# Patient Record
Sex: MALE
[De-identification: names, ages, dates, MRNs, and addresses within clinical notes are randomized per-mention and may not be internally consistent; named-entity substitution may affect disease eponyms.]

---

## 2020-04-26 ENCOUNTER — TRANSCRIPTION ENCOUNTER (OUTPATIENT)
Age: 55
End: 2020-04-26

## 2020-05-18 ENCOUNTER — FORM ENCOUNTER (OUTPATIENT)
Age: 55
End: 2020-05-18

## 2020-05-18 ENCOUNTER — NON-APPOINTMENT (OUTPATIENT)
Age: 55
End: 2020-05-18

## 2020-05-18 ENCOUNTER — APPOINTMENT (OUTPATIENT)
Dept: PULMONOLOGY | Facility: CLINIC | Age: 55
End: 2020-05-18
Payer: COMMERCIAL

## 2020-05-18 PROBLEM — Z00.00 ENCOUNTER FOR PREVENTIVE HEALTH EXAMINATION: Status: ACTIVE | Noted: 2020-05-18

## 2020-05-18 PROCEDURE — 99203 OFFICE O/P NEW LOW 30 MIN: CPT | Mod: 95

## 2020-05-18 NOTE — HISTORY OF PRESENT ILLNESS
[FreeTextEntry1] : Mr. Montes, no medical history, initially started to have COVID19 symptom in March. Primary symptoms were ageusia and anosmia. Did not have respiratory issues. Had some initial headaches. All symptoms except headaches resolved. The headaches have returned in April; they are consistent; not associated with photophobia or auras. Not localized to one part of the head. Probably worse with movement but not positional. Had MRI/MRA imaging with Dr. Wolf ([place]) and it was negative as per report. Has never been hospitalized for COVID related treatment. No bloodwork ever done. No other medical history. Family history of smoking related cancer. Never smoker. Has tried Fioricet without relief; OTC NSAIDs and APAP have not been effective.

## 2020-05-18 NOTE — PLAN
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 Enrolled in the [employer] program. There are no red flags symptoms/signs during our visit. Negative MRI/MRA imaging is reassuring; will obtain records for completeness for an objective review. Referred him for home based lab draws, including IgG antibodies against COVID19. Also referred him to home care for an objective RN assessment. Once blood work is resulted will recommend some therapies, including Mg versus Toradol versus Medrol pack (if other options fail). I will speak to Mr. Montes within 24 hours after blood work has been resulted. All questions answered.

## 2020-05-19 ENCOUNTER — NON-APPOINTMENT (OUTPATIENT)
Age: 55
End: 2020-05-19

## 2020-05-19 RX ORDER — OMEGA-3/DHA/EPA/FISH OIL 300-1000MG
400 CAPSULE ORAL AT BEDTIME
Qty: 30 | Refills: 0 | Status: ACTIVE | COMMUNITY
Start: 2020-05-19 | End: 1900-01-01

## 2020-05-20 ENCOUNTER — APPOINTMENT (OUTPATIENT)
Dept: NEUROLOGY | Facility: CLINIC | Age: 55
End: 2020-05-20
Payer: COMMERCIAL

## 2020-05-20 DIAGNOSIS — R51 HEADACHE: ICD-10-CM

## 2020-05-20 PROCEDURE — 99215 OFFICE O/P EST HI 40 MIN: CPT | Mod: 95

## 2020-05-20 RX ORDER — KETOROLAC TROMETHAMINE 10 MG/1
10 TABLET, FILM COATED ORAL 3 TIMES DAILY
Qty: 15 | Refills: 0 | Status: ACTIVE | COMMUNITY
Start: 2020-05-20 | End: 1900-01-01

## 2020-05-21 PROBLEM — R51 HEADACHE DUE TO LOW CEREBROSPINAL FLUID PRESSURE: Status: ACTIVE | Noted: 2020-05-21

## 2020-05-21 NOTE — DISCUSSION/SUMMARY
[FreeTextEntry1] : Impression:\par 1) chronic daily headache >6 weeks now, No migranous features, and with a postural component.  Never completely improved, but much better when lying.  Worse when moving around.  Sensation of bifrontal heaviness.  Butalbital used, but only at night.  Naproxen, advil, tylenol also tried, and started magnesium yesterday.  MRI brain with non-specific WM changes.  No sign of brain sag, but not specifically tested.  Postural component and character fits pattern of low pressure headache, possibly related to coughing during covid, though he reports no violent coughing.\par \par 2) covid/post-covid: pt did present with anosmia and mild headache, so some concern regarding wilfrido-covid inflammation as an CNS irritant.  Looks too well for neurotropism and MRI negative.  However, may respond to anti-inflammatories, such as toradol or medrol dose-curtis.\par \par \par Plan:\par 1) top on DDX is low pressure headaches, will need to repeat MRI brain, but with contrast, and additionally perform MRI myelography to look for nerve root sleeve leakage site.\par 2) caffeine and fluids can be helpful, so butalbital/caffeine tid may be helpful to temporize\par 3) consider medrol dose pack if ineffective.\par

## 2020-05-21 NOTE — PHYSICAL EXAM
[FreeTextEntry1] : \par No expressive or receptive errors.\par \par General:\par Constitutional:  Sitting comfortably in NAD.\par Psychiatric: well-groomed, appropriate affect, insight/judgment intact\par No nuchal rigidity or neck tightness\par \par Cognitive:\par Orientation, language, memory and knowledge screens intact.\par Cranial Nerves:\par IIII/IV/VI: PERRL EOMF No nystagmus\par VII: Face appears symmetric \par XII: Tongue midline\par \par Motor:\par Power: no pronator drift.\par \par Coordination/Gait:\par Fine motor normal with normal rapid finger taps\par

## 2020-05-21 NOTE — HISTORY OF PRESENT ILLNESS
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  \par Slight headache 5/10 at the beginning was in the forehead.   Worsened when moving around.\par Returned to work and working out.  But 2 weeks later, 8/10, still dull forehead, maybe more holocephalic.  \par No nausea. No hypersensitivity.\par Lying down not so bad, 2-3/10.  Never a 0/10.  Sleeps 5-6 hours\par Works out 2hours/day, 5d/week.  Cardio and strength. He stopped because of the pain - tried a stepper and pushups without any worsening.\par \par No neck stiffness pain.  No jaw, ear or dental pain or issues.\par \par No prior history of headache.  Never very sick.\par No family history of headaches.\par \par Work:  [employer], supervises a lot of the work.\par Never Smoker\par Alcohol - light beers on weekend, none in weeks.\par No recreational habits.\par \par Butalbital unhelpful, took 4 total, may have helped him sleep, but did not try it during the day.\par No other caffeine containing medications.\par OTC unhelpful.  Naproxen.\par Magnesium started last night.\par \par

## 2020-05-29 ENCOUNTER — APPOINTMENT (OUTPATIENT)
Dept: MRI IMAGING | Facility: CLINIC | Age: 55
End: 2020-05-29
Payer: COMMERCIAL

## 2020-05-29 ENCOUNTER — OUTPATIENT (OUTPATIENT)
Dept: OUTPATIENT SERVICES | Facility: HOSPITAL | Age: 55
LOS: 1 days | End: 2020-05-29

## 2020-05-29 PROCEDURE — 72158 MRI LUMBAR SPINE W/O & W/DYE: CPT | Mod: 26

## 2020-05-29 PROCEDURE — 70553 MRI BRAIN STEM W/O & W/DYE: CPT | Mod: 26

## 2020-05-29 PROCEDURE — 72157 MRI CHEST SPINE W/O & W/DYE: CPT | Mod: 26

## 2020-05-29 PROCEDURE — 72156 MRI NECK SPINE W/O & W/DYE: CPT | Mod: 26

## 2020-06-01 ENCOUNTER — FORM ENCOUNTER (OUTPATIENT)
Age: 55
End: 2020-06-01

## 2020-06-03 RX ORDER — METHYLPREDNISOLONE 4 MG/1
4 TABLET ORAL
Qty: 1 | Refills: 0 | Status: ACTIVE | COMMUNITY
Start: 2020-06-03 | End: 1900-01-01

## 2020-06-08 ENCOUNTER — APPOINTMENT (OUTPATIENT)
Dept: NEUROLOGY | Facility: CLINIC | Age: 55
End: 2020-06-08
Payer: COMMERCIAL

## 2020-06-08 DIAGNOSIS — R51 HEADACHE: ICD-10-CM

## 2020-06-08 DIAGNOSIS — U07.1 COVID-19: ICD-10-CM

## 2020-06-08 PROCEDURE — 99213 OFFICE O/P EST LOW 20 MIN: CPT | Mod: 95

## 2020-06-08 RX ORDER — VENLAFAXINE HYDROCHLORIDE 75 MG/1
75 CAPSULE, EXTENDED RELEASE ORAL
Qty: 90 | Refills: 0 | Status: ACTIVE | COMMUNITY
Start: 2020-06-08 | End: 1900-01-01

## 2020-06-08 NOTE — HISTORY OF PRESENT ILLNESS
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  Has trouble putting a number /10 but severe enough to alter his daily behaviors significantly.\par \par Still with a sense of pain when vertical - though no evidence of CSF leak on MRI or MRI myelography.\par When upright more than 25 minutes, he finds he needs to lie down again.  It is holocephalic.\par The medrol dose pack was ineffective, today is the final day of that dose.\par He feels that every medication has been ineffective - butalbital, naproxen, toradol, magnesium and now medrol dose-pack.\par No nausea, no autonomic symptoms. \par \par Has not worsened, no improvement.\par \par Prior:\par Late March 2020: developed Covid-19, cough, fever x 48h, no taste no smell, though this resolved in 2 weeks.  \par Slight headache 5/10 at the beginning was in the forehead.   Worsened when moving around.\par Returned to work and working out.  But 2 weeks later, 8/10, still dull forehead, maybe more holocephalic.  \par No nausea. No hypersensitivity.\par Lying down not so bad, 2-3/10.  Never a 0/10.  Sleeps 5-6 hours\par Works out 2hours/day, 5d/week.  Cardio and strength. He stopped because of the pain - tried a stepper and pushups without any worsening.\par \par No neck stiffness pain.  No jaw, ear or dental pain or issues.\par \par No prior history of headache.  Never very sick.\par No family history of headaches.\par \par Work:  [employer], supervises a lot of the work.\par Never Smoker\par Alcohol - light beers on weekend, none in weeks.\par No recreational habits.\par \par Butalbital unhelpful, took 4 total, may have helped him sleep, but did not try it during the day.\par No other caffeine containing medications.\par OTC unhelpful.  Naproxen.\par Magnesium started last night.\par

## 2020-06-08 NOTE — DISCUSSION/SUMMARY
[FreeTextEntry1] : Pt reports no significant improvements with the post-Covid new onset headache that is still exquisitely postural.  No migranous features, no autonomic symptoms.\par \par Nothing else helps (butalbital, NSAIDS, medrol, magnesium) with the headache except lying down.  Keeping well hydrated.  Has not tried high caffeine intake.  Have not attempted daily preventative or triptan as of yet.\par MRI brain+C without evidence of intracranial hypotension, and MRI spine without evidence for CSF leak.\par Still no neck pain.\par \par Plan:\par 1) will attempt effexor, SNRI which may help with pressure and pain sensation\par 2) second opinion with Dr. Logan Adam\par 3) may also consider low-pressure headache consult at [facility] at the [facility].\par

## 2020-06-08 NOTE — CONSULT LETTER
[Dear  ___] : Dear ~VINEET, [Referral Letter:] : I am referring [unfilled] to you for further evaluation.  My most recent evaluation follows. [Please see my note below.] : Please see my note below. [Referral Closing:] : Thank you very much for seeing this patient.  If you have any questions, please do not hesitate to contact me. [Sincerely,] : Sincerely, [FreeTextEntry2] : Logan Adam. MD [FreeTextEntry3] : [place]

## 2020-06-08 NOTE — PHYSICAL EXAM
[FreeTextEntry1] : \par General:\par Constitutional:  Sitting comfortably in NAD.\par Psychiatric: well-groomed, appropriate affect, insight/judgment intact\par \par Cognitive:\par Orientation, language, memory and knowledge screens intact.\par No expressive or receptive errors.\par \par Cranial Nerves:\par VII: Face appears symmetric \par XII: Tongue midline\par \par Motor:\par Power: no pronator drift.\par \par Coordination/Gait:\par Fine motor normal with normal rapid finger taps\par

## 2020-06-09 PROBLEM — U07.1 COVID-19 VIRUS INFECTION: Status: ACTIVE | Noted: 2020-05-18

## 2020-06-09 PROBLEM — R51 HEADACHE: Status: ACTIVE | Noted: 2020-05-19

## 2020-06-19 ENCOUNTER — TRANSCRIPTION ENCOUNTER (OUTPATIENT)
Age: 55
End: 2020-06-19

## 2020-06-19 ENCOUNTER — APPOINTMENT (OUTPATIENT)
Dept: PAIN MANAGEMENT | Facility: CLINIC | Age: 55
End: 2020-06-19
Payer: COMMERCIAL

## 2020-06-19 DIAGNOSIS — G44.52 NEW DAILY PERSISTENT HEADACHE (NDPH): ICD-10-CM

## 2020-06-19 DIAGNOSIS — G44.229 CHRONIC TENSION-TYPE HEADACHE, NOT INTRACTABLE: ICD-10-CM

## 2020-06-19 PROCEDURE — 99214 OFFICE O/P EST MOD 30 MIN: CPT | Mod: 95

## 2020-06-19 RX ORDER — TIZANIDINE 4 MG/1
4 TABLET ORAL
Qty: 45 | Refills: 1 | Status: ACTIVE | COMMUNITY
Start: 2020-06-19 | End: 1900-01-01

## 2020-06-19 RX ORDER — DEXAMETHASONE 4 MG/1
4 TABLET ORAL
Qty: 12 | Refills: 0 | Status: ACTIVE | COMMUNITY
Start: 2020-06-19 | End: 1900-01-01

## 2020-06-19 RX ORDER — TOPIRAMATE 25 MG/1
25 TABLET, FILM COATED ORAL
Qty: 120 | Refills: 3 | Status: ACTIVE | COMMUNITY
Start: 2020-06-19 | End: 1900-01-01

## 2020-06-19 NOTE — PHYSICAL EXAM
[General Appearance - Alert] : alert [General Appearance - In No Acute Distress] : in no acute distress [General Appearance - Well Developed] : well developed [General Appearance - Well Nourished] : well nourished [General Appearance - Well-Appearing] : healthy appearing [Oriented To Time, Place, And Person] : oriented to person, place, and time [Impaired Insight] : insight and judgment were intact [Mood] : the mood was normal [Affect] : the affect was normal [Memory Remote] : remote memory was not impaired [Person] : oriented to person [Memory Recent] : recent memory was not impaired [Short Term Intact] : short term memory intact [Time] : oriented to time [Place] : oriented to place [Remote Intact] : remote memory intact [Naming Objects] : no difficulty naming common objects [Registration Intact] : recent registration memory intact [Fluency] : fluency intact [Reading] : reading intact [Comprehension] : comprehension intact [Past History] : adequate knowledge of personal past history [Current Events] : adequate knowledge of current events [Cranial Nerves Facial (VII)] : face symmetrical [Vocabulary] : adequate range of vocabulary [Cranial Nerves Vestibulocochlear (VIII)] : hearing was intact bilaterally [Cranial Nerves Oculomotor (III)] : extraocular motion intact [Cranial Nerves Accessory (XI - Cranial And Spinal)] : head turning and shoulder shrug symmetric [Cranial Nerves Hypoglossal (XII)] : there was no tongue deviation with protrusion [No Muscle Atrophy] : normal bulk in all four extremities [Motor Handedness Right-Handed] : the patient is right hand dominant [Sclera] : the sclera and conjunctiva were normal [Strabismus] : no strabismus was seen [No DONATO] : no internuclear ophthalmoplegia [Neck Cervical Mass (___cm)] : no neck mass was observed [Exaggerated Use Of Accessory Muscles For Inspiration] : no accessory muscle use [Abnormal Walk] : normal gait [Nail Clubbing] : no clubbing  or cyanosis of the fingernails [Involuntary Movements] : no involuntary movements were seen [Edema] : there was no peripheral edema [Musculoskeletal - Swelling] : no joint swelling seen [] : no rash [Skin Color & Pigmentation] : normal skin color and pigmentation [Motor Strength Upper Extremities Bilaterally] : strength was normal in both upper extremities [Allodynia] : no ~T allodynia present [Tremor] : no tremor present [Dysdiadochokinesia Bilaterally] : not present

## 2020-06-19 NOTE — REVIEW OF SYSTEMS
[Feeling Tired] : feeling tired [Feeling Poorly] : feeling poorly [Eye Pain] : eye pain [Cough] : cough [Shortness Of Breath] : shortness of breath [Arthralgias] : arthralgias [Snoring] : snoring [Fever] : no fever [Chills] : no chills [Eyesight Problems] : no eyesight problems [Loss Of Hearing] : no hearing loss [Chest Pain] : no chest pain [Palpitations] : no palpitations [Constipation] : no constipation [Diarrhea] : no diarrhea [Back Pain] : ~T no back pain [Sleep Disturbances] : no sleep disturbances [Muscle Weakness] : no muscle weakness [Swollen Glands] : no swollen glands [Swollen Glands In The Neck] : no swollen glands in the neck

## 2020-06-19 NOTE — ASSESSMENT
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

## 2020-06-19 NOTE — HISTORY OF PRESENT ILLNESS
[Home] : at home, [unfilled] , at the time of the visit. [Other Location: e.g. Home (Enter Location, City,State)___] : at [unfilled] [Verbal consent obtained from patient] : the patient, [unfilled] [Chronic Headache] : chronic headache [Dizziness] : dizziness [> 4 hours] : > 4 hours [Daily] : daily [stayed the same] : stayed the same [4] : a minimum pain level of 4/10 [8] : a maximum pain level of 8/10 [Worsened] : The patient reports ~his/her~ symptoms since the last visit have worsened [FreeTextEntry1] : Pt notes he had COVID in Mid March, had improved with cough and headache and after about 10 days headaches got signficantly more intense about 2 months ago.  Have been continuous since.  Took meds from a few mds and then saw Dr. Matias who ordered MRIs and started him on venlafaxine which led him to be nauseated, vomit and went to ED at [facility] about 10 days ago.  Did bloodwork that was wnl, repeated MRI's which was ok, retested positive for COVID.\par No history of headaches and no clear family history.\par The headaches are fairly constant in quality.  \par Pain is in front of head, worsens with moving, improves slightly with sitting and lying.  \par Has been about 2 months since he had a day headache free.\par Does note he had onset of headache with COVID infection.  This was indolent and not thunderclap.  \par N significant photophobia or phonophobia.  No nausea.  no significant neck pain.  No significant change in range of motion.\par Did have some slight tinnitus occasionally on right when lying down.  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 Does have some excess phlegm and olfaction not the same.\par Did have MRA that looked ok.\ayla has been given naproxen, butalbital, toradol, prednisone and then lexapro 5mg (because of Dr. Hamm scripts.) [Nausea] : no nausea [Vomiting] : no Vomiting [Photophobia] : no photophobia [Phonophobia] : no phonophobia [Neck Pain] : no neck pain

## 2020-06-22 ENCOUNTER — TRANSCRIPTION ENCOUNTER (OUTPATIENT)
Age: 55
End: 2020-06-22

## 2020-06-30 ENCOUNTER — TRANSCRIPTION ENCOUNTER (OUTPATIENT)
Age: 55
End: 2020-06-30